# Patient Record
Sex: MALE | NOT HISPANIC OR LATINO | Employment: FULL TIME | URBAN - METROPOLITAN AREA
[De-identification: names, ages, dates, MRNs, and addresses within clinical notes are randomized per-mention and may not be internally consistent; named-entity substitution may affect disease eponyms.]

---

## 2023-09-07 ENCOUNTER — OFFICE VISIT (OUTPATIENT)
Dept: PRIMARY CARE | Facility: CLINIC | Age: 58
End: 2023-09-07
Payer: COMMERCIAL

## 2023-09-07 VITALS
HEART RATE: 62 BPM | DIASTOLIC BLOOD PRESSURE: 68 MMHG | TEMPERATURE: 97 F | WEIGHT: 139 LBS | OXYGEN SATURATION: 97 % | HEIGHT: 65 IN | SYSTOLIC BLOOD PRESSURE: 118 MMHG | BODY MASS INDEX: 23.16 KG/M2

## 2023-09-07 DIAGNOSIS — Z00.00 VISIT FOR PREVENTIVE HEALTH EXAMINATION: Primary | ICD-10-CM

## 2023-09-07 LAB
NON-UH HIE A/G RATIO: 1.2
NON-UH HIE ALB: 3.8 G/DL (ref 3.4–5)
NON-UH HIE ALK PHOS: 75 UNIT/L (ref 46–116)
NON-UH HIE BILIRUBIN, TOTAL: 0.7 MG/DL (ref 0.2–1)
NON-UH HIE BUN/CREAT RATIO: 22.2
NON-UH HIE BUN: 20 MG/DL (ref 9–23)
NON-UH HIE CALCIUM: 9.4 MG/DL (ref 8.7–10.4)
NON-UH HIE CALCULATED LDL CHOLESTEROL: 125 MG/DL (ref 60–130)
NON-UH HIE CALCULATED OSMOLALITY: 282 MOSM/KG (ref 275–295)
NON-UH HIE CHLORIDE: 109 MMOL/L (ref 98–107)
NON-UH HIE CHOLESTEROL: 215 MG/DL (ref 100–200)
NON-UH HIE CO2, VENOUS: 25 MMOL/L (ref 20–31)
NON-UH HIE CREATININE: 0.9 MG/DL (ref 0.6–1.1)
NON-UH HIE FREE T4: 1.06 NG/DL (ref 0.89–1.76)
NON-UH HIE GFR AA: >60
NON-UH HIE GLOBULIN: 3.3 G/DL
NON-UH HIE GLOMERULAR FILTRATION RATE: >60 ML/MIN/1.73M?
NON-UH HIE GLUCOSE: 90 MG/DL (ref 74–106)
NON-UH HIE GOT: 21 UNIT/L (ref 15–37)
NON-UH HIE GPT: 25 UNIT/L (ref 10–49)
NON-UH HIE HCT: 42.9 % (ref 41–52)
NON-UH HIE HDL CHOLESTEROL: 57 MG/DL (ref 40–60)
NON-UH HIE HGB: 14.4 G/DL (ref 13.5–17.5)
NON-UH HIE INSTR WBC ND: 5.8
NON-UH HIE K: 4.4 MMOL/L (ref 3.5–5.1)
NON-UH HIE MCH: 28.5 PG (ref 27–34)
NON-UH HIE MCHC: 33.4 G/DL (ref 32–37)
NON-UH HIE MCV: 85.1 FL (ref 80–100)
NON-UH HIE MPV: 8.5 FL (ref 7.4–10.4)
NON-UH HIE NA: 140 MMOL/L (ref 135–145)
NON-UH HIE PLATELET: 255 X10 (ref 150–450)
NON-UH HIE PROSTATIC SPECIFIC AG SCREEN: 0.4 NG/ML (ref 0–4)
NON-UH HIE RBC: 5.04 X10 (ref 4.7–6.1)
NON-UH HIE RDW: 12.9 % (ref 11.5–14.5)
NON-UH HIE TOTAL CHOL/HDL CHOL RATIO: 3.8
NON-UH HIE TOTAL PROTEIN: 7.1 G/DL (ref 5.7–8.2)
NON-UH HIE TRIGLYCERIDES: 167 MG/DL (ref 30–150)
NON-UH HIE TSH: 0.51 UIU/ML (ref 0.55–4.78)
NON-UH HIE WBC: 5.8 X10 (ref 4.5–11)

## 2023-09-07 PROCEDURE — 99396 PREV VISIT EST AGE 40-64: CPT | Performed by: FAMILY MEDICINE

## 2023-09-07 NOTE — PROGRESS NOTES
"Subjective   Patient ID: Kennedi Perez is a 58 y.o. male who presents for Annual Exam.    Assessment/Plan     Problem List Items Addressed This Visit    None  Visit Diagnoses       Visit for preventive health examination    -  Primary        Cologuard July 2021 within normal limits  Fasting lab work today  PSA check today  CT chest for lung cancer screening 12/22 wnl- consider  12/23  Prevnar 20 received  Discussed about shingles vaccine  Staying physically active  Follow-up every year for physical  Symptomatic treatment for cough congestion  Patient understands and agrees with plan.    CT cardiac scoring  HPI    58-year-old male here for physical     History of smoking more than 20 years 1 pack/day  Chewing tobacco    Discussed in detail about quitting tobacco use using nicotine gums  Occasional alcohol use  History of appendectomy        Otherwise no new signs and symptoms    No Known Allergies    No current outpatient medications on file.     No current facility-administered medications for this visit.       Objective   Visit Vitals  /68 (BP Location: Left arm, Patient Position: Sitting)   Pulse 62   Temp 36.1 °C (97 °F)   Ht 1.651 m (5' 5\")   Wt 63 kg (139 lb)   SpO2 97%   BMI 23.13 kg/m²   Smoking Status Never   BSA 1.7 m²     Physical Exam    Constitutional   General appearance: Alert and in no acute distress.   Head and Face   Head and face: Normal.     Palpation of the face and sinuses: Normal.    Eyes   Inspection of eyes: Sclera and conjunctiva were normal.    Pupil exam: Pupils were equal in size. Extraocular movements were intact.   Ears, Nose, Mouth, and Throat   Ears: Auricles: Normal.    Otoscopic examination: Tympanic membranes: Normal with no congestion and no discharge. Otic Canals: Normal without tenderness, congestion or discharge.    Hearing: Normal.     Nasal mucosa, septum, and turbinates: Normal without edema or erythema.    Lips, teeth, and gums: Normal.    Oropharynx: Normal with " moist mucus membranes, no congestion. Tonsils: Normal no follicles.   Neck   Neck Exam: Appearance of the neck was normal. No neck masses observed.    Thyroid exam: Not enlarged and no palpable thyroid nodules.   Pulmonary   Respiratory assessment: No respiratory distress, normal respiratory rhythm and effort.    Auscultation of Lungs: Clear bilateral breath sounds.   Cardiovascular   Auscultation of heart: Apical pulse normal, heart rate and rhythm normal, normal S1 and S2, no murmurs and no pericardial rub.    Carotid arteries: Pulses normal with no bruits.    Exam for edema: No peripheral edema.   Chest   Chest: Normal A_P diameter, no pulsation, no intercostal withdrawing. Trachea central.   Abdomen   Abdominal Exam: No bruits, normal bowel sounds, soft, non-tender, no abdominal mass palpated.    Liver and Spleen exam: No hepato-splenomegaly.    Examination for hernias: Normal.    Lymphatic   Palpation of lymph nodes in neck: No cervical lymphadenopathy.   Musculoskeletal   Examination of gait: Normal.    Inspection of digits and nails: No clubbing or cyanosis of the fingernails.    Inspection/palpation of joints, bones and muscles: No joint swelling. Normal movement of all extremities.    Range of Motion: Normal movement of all extremities.   Skin   Skin inspection: Normal skin color and pigmentation, normal skin turgor and no visible rash.   Neurologic   Cranial nerves: Nerves 2-12 were intact, no focal neuro defects.    Reflexes: Normal.     Sensation: Normal.     Coordination: Normal.    Psychiatric   Judgment and insight: Intact.    Orientation: Oriented to person, place, and time.    Recent and remote memory: Normal.     Mood and affect: Normal.     Genitourinary Declined.    Immunization History   Administered Date(s) Administered    Influenza, seasonal, injectable 10/19/2022    Moderna SARS-CoV-2 Vaccination 04/10/2021, 05/13/2021, 12/17/2021    Pneumococcal conjugate vaccine, 20-valent, adult (PREVNAR  20) 11/04/2022       Review of Systems    No visits with results within 4 Month(s) from this visit.   Latest known visit with results is:   Legacy Encounter on 11/04/2022   Component Date Value Ref Range Status    Color, Urine 11/04/2022 YELLOW  STRAW,YELLOW Final    Appearance, Urine 11/04/2022 CLEAR  CLEAR Final    Specific Gravity, Urine 11/04/2022 1.015  1.005 - 1.035 Final    pH, Urine 11/04/2022 5.0  5.0 - 8.0 Final    Protein, Urine 11/04/2022 NEGATIVE  NEGATIVE mg/dL Final    Glucose, Urine 11/04/2022 NEGATIVE  NEGATIVE mg/dL Final    Blood, Urine 11/04/2022 NEGATIVE  NEGATIVE Final    Ketones, Urine 11/04/2022 NEGATIVE  NEGATIVE mg/dL Final    Bilirubin, Urine 11/04/2022 NEGATIVE  NEGATIVE Final    Urobilinogen, Urine 11/04/2022 <2.0  0.0 - 1.9 mg/dL Final    Nitrite, Urine 11/04/2022 NEGATIVE  NEGATIVE Final    Leukocyte Esterase, Urine 11/04/2022 NEGATIVE  NEGATIVE Final    Cholesterol 11/04/2022 212 (H)  0 - 199 mg/dL Final    HDL 11/04/2022 59.4  mg/dL Final    Cholesterol/HDL Ratio 11/04/2022 3.6   Final    LDL 11/04/2022 141 (H)  0 - 99 mg/dL Final    VLDL 11/04/2022 11  0 - 40 mg/dL Final    Triglycerides 11/04/2022 56  0 - 149 mg/dL Final    Glucose 11/04/2022 86  74 - 99 mg/dL Final    Sodium 11/04/2022 140  136 - 145 mmol/L Final    Potassium 11/04/2022 4.2  3.5 - 5.3 mmol/L Final    Chloride 11/04/2022 107  98 - 107 mmol/L Final    Bicarbonate 11/04/2022 24  21 - 32 mmol/L Final    Anion Gap 11/04/2022 13  10 - 20 mmol/L Final    Urea Nitrogen 11/04/2022 17  6 - 23 mg/dL Final    Creatinine 11/04/2022 0.88  0.50 - 1.30 mg/dL Final    GFR MALE 11/04/2022 >90  >90 mL/min/1.73m2 Final    Calcium 11/04/2022 9.4  8.6 - 10.6 mg/dL Final    Albumin 11/04/2022 4.7  3.4 - 5.0 g/dL Final    Alkaline Phosphatase 11/04/2022 75  33 - 120 U/L Final    Total Protein 11/04/2022 7.5  6.4 - 8.2 g/dL Final    AST 11/04/2022 19  9 - 39 U/L Final    Total Bilirubin 11/04/2022 0.6  0.0 - 1.2 mg/dL Final    ALT (SGPT)  11/04/2022 22  10 - 52 U/L Final    WBC 11/04/2022 8.5  4.4 - 11.3 x10E9/L Final    nRBC 11/04/2022 0.0  0.0 - 0.0 /100 WBC Final    RBC 11/04/2022 5.27  4.50 - 5.90 x10E12/L Final    Hemoglobin 11/04/2022 14.9  13.5 - 17.5 g/dL Final    Hematocrit 11/04/2022 47.4  41.0 - 52.0 % Final    MCV 11/04/2022 90  80 - 100 fL Final    MCHC 11/04/2022 31.4 (L)  32.0 - 36.0 g/dL Final    Platelets 11/04/2022 360  150 - 450 x10E9/L Final    RDW 11/04/2022 12.5  11.5 - 14.5 % Final    Prostate Specific Antigen,Screen 11/04/2022 0.46  0.00 - 4.00 ng/mL Final    TSH 11/04/2022 0.45  0.44 - 3.98 mIU/L Final       Radiology: Reviewed imaging in powerchart.  No results found.    No family history on file.  Social History     Socioeconomic History    Marital status:      Spouse name: None    Number of children: None    Years of education: None    Highest education level: None   Occupational History    None   Tobacco Use    Smoking status: Never    Smokeless tobacco: Current   Substance and Sexual Activity    Alcohol use: Never    Drug use: Never    Sexual activity: None   Other Topics Concern    None   Social History Narrative    None     Social Determinants of Health     Financial Resource Strain: Not on file   Food Insecurity: Not on file   Transportation Needs: Not on file   Physical Activity: Not on file   Stress: Not on file   Social Connections: Not on file   Intimate Partner Violence: Not on file   Housing Stability: Not on file     History reviewed. No pertinent past medical history.  History reviewed. No pertinent surgical history.    Charting was completed using voice recognition technology and may include unintended errors.

## 2023-10-30 ENCOUNTER — TELEMEDICINE (OUTPATIENT)
Dept: PRIMARY CARE | Facility: CLINIC | Age: 58
End: 2023-10-30
Payer: COMMERCIAL

## 2023-10-30 VITALS — HEIGHT: 65 IN | BODY MASS INDEX: 21.66 KG/M2 | WEIGHT: 130 LBS

## 2023-10-30 DIAGNOSIS — R05.1 ACUTE COUGH: Primary | ICD-10-CM

## 2023-10-30 DIAGNOSIS — R50.9 FEVER, UNSPECIFIED FEVER CAUSE: ICD-10-CM

## 2023-10-30 DIAGNOSIS — U07.1 COVID-19 VIRUS DETECTED: ICD-10-CM

## 2023-10-30 PROCEDURE — 99213 OFFICE O/P EST LOW 20 MIN: CPT | Performed by: FAMILY MEDICINE

## 2023-10-30 NOTE — PROGRESS NOTES
"Subjective   Patient ID: Kamleshkumar MESSI Perez is a 58 y.o. male who presents for Covid-19 Home Monitoring Video Visit and Cough (Headache, fever, ).    Assessment/Plan     Problem List Items Addressed This Visit    None  Visit Diagnoses       Acute cough    -  Primary    Relevant Medications    nirmatrelvir-ritonavir (PAXLOVID) 300 mg (150 mg x 2)-100 mg tablet therapy pack    COVID-19 virus detected        Relevant Medications    nirmatrelvir-ritonavir (PAXLOVID) 300 mg (150 mg x 2)-100 mg tablet therapy pack    Fever, unspecified fever cause        Relevant Medications    nirmatrelvir-ritonavir (PAXLOVID) 300 mg (150 mg x 2)-100 mg tablet therapy pack        This visit was completed via VIRTUAL VIDEO/Audio due to the restrictions of the COVID-19 pandemic. All issues as below were discussed and addressed but no physical exam was performed. If it was felt that the patient should be evaluated in clinic then they were directed there. The patient verbally consented to visit.     Phone call was made per pt preference    HPI    58 y old m c/o fever headache since last 1 day started last night    Taking tylenol    No n. V. Cp, sob    Tested positive for covid today morning    Consider above rx - call us back if sx are worsening, pt agrees.    No Known Allergies    Current Outpatient Medications   Medication Sig Dispense Refill    nirmatrelvir-ritonavir (PAXLOVID) 300 mg (150 mg x 2)-100 mg tablet therapy pack Take 3 tablets by mouth 2 times a day for 5 days. Follow the instructions on the package 30 tablet 0     No current facility-administered medications for this visit.       Objective   Visit Vitals  Ht 1.651 m (5' 5\")   Wt 59 kg (130 lb)   BMI 21.63 kg/m²   Smoking Status Never   BSA 1.64 m²     Physical Exam    Immunization History   Administered Date(s) Administered    Influenza, seasonal, injectable 10/19/2022    Moderna SARS-CoV-2 Vaccination 04/10/2021, 05/13/2021, 12/17/2021    Pneumococcal conjugate vaccine, " 20-valent (PREVNAR 20) 11/04/2022       Review of Systems    Orders Only on 09/07/2023   Component Date Value Ref Range Status    [facility] RBC 09/07/2023 5.04  4.70 - 6.10 x10 Final    [facility] RDW 09/07/2023 12.9  11.5 - 14.5 % Final    [facility] MCH 09/07/2023 28.5  27.0 - 34.0 pg Final    [facility] WBC 09/07/2023 5.8  4.5 - 11.0 x10 Final    [facility] HCT 09/07/2023 42.9  41.0 - 52.0 % Final    [facility] Platelet 09/07/2023 255  150 - 450 x10 Final    [facility] MCHC 09/07/2023 33.4  32.0 - 37.0 g/dL Final    [facility] MCV 09/07/2023 85.1  80.0 - 100.0 fL Final    [facility] Instr WBC ND 09/07/2023 5.8   Final    [facility] MPV 09/07/2023 8.5  7.4 - 10.4 fL Final    [facility] HGB 09/07/2023 14.4  13.5 - 17.5 g/dL Final    [facility] Prostatic Specific Ag S* 09/07/2023 0.4  0.0 - 4.0 ng/mL Final    [facility] TSH 09/07/2023 0.51 (L)  0.55 - 4.78 uIU/ml Final    [facility] CO2, venous 09/07/2023 25.0  20.0 - 31.0 mmol/L Final    [facility] Glomerular Filtration R* 09/07/2023 >60  mL/min/1.73m? Final    [facility] Calculated Osmolality 09/07/2023 282  275 - 295 mOsm/kg Final    [facility] K 09/07/2023 4.4  3.5 - 5.1 mmol/L Final    [facility] Globulin 09/07/2023 3.3  g/dL Final    [facility] Calcium 09/07/2023 9.4  8.7 - 10.4 mg/dL Final    [facility] BUN 09/07/2023 20  9 - 23 mg/dL Final    [facility] GOT 09/07/2023 21  15 - 37 unit/L Final    [facility] ALB 09/07/2023 3.8  3.4 - 5.0 g/dL Final    [facility] Glucose 09/07/2023 90  74 - 106 mg/dL Final    [facility] GFR AA 09/07/2023 >60   Final    [facility] Bilirubin, Total 09/07/2023 0.70  0.20 - 1.00 mg/dL Final    [facility] Chloride 09/07/2023 109 (H)  98 - 107 mmol/L Final    [facility] A/G Ratio 09/07/2023 1.2   Final    [facility] Na 09/07/2023 140  135 - 145 mmol/L Final    [facility] BUN/Creat Ratio 09/07/2023 22.2   Final    [facility] GPT 09/07/2023 25  10 - 49 unit/L Final    [facility] Creatinine 09/07/2023 0.9  0.6 - 1.1 mg/dL Final     [facility] Alk Phos 09/07/2023 75  46 - 116 unit/L Final    [facility] Total Protein 09/07/2023 7.1  5.7 - 8.2 g/dL Final    [facility] Total Chol/HDL Chol Rat* 09/07/2023 3.8   Final    [facility] Triglycerides 09/07/2023 167 (H)  30 - 150 mg/dL Final    [facility] Cholesterol 09/07/2023 215 (H)  100 - 200 mg/dL Final    [facility] Calculated LDL Choleste* 09/07/2023 125  60 - 130 mg/dL Final    [facility] HDL Cholesterol 09/07/2023 57  40 - 60 mg/dL Final    [facility] Free T4 09/07/2023 1.06  0.89 - 1.76 ng/dL Final       Radiology: Reviewed imaging in powerchart.  No results found.    No family history on file.  Social History     Socioeconomic History    Marital status:      Spouse name: None    Number of children: None    Years of education: None    Highest education level: None   Occupational History    None   Tobacco Use    Smoking status: Never    Smokeless tobacco: Current   Substance and Sexual Activity    Alcohol use: Never    Drug use: Never    Sexual activity: None   Other Topics Concern    None   Social History Narrative    None     Social Determinants of Health     Financial Resource Strain: Not on file   Food Insecurity: Not on file   Transportation Needs: Not on file   Physical Activity: Not on file   Stress: Not on file   Social Connections: Not on file   Intimate Partner Violence: Not on file   Housing Stability: Not on file     History reviewed. No pertinent past medical history.  History reviewed. No pertinent surgical history.    Charting was completed using voice recognition technology and may include unintended errors.

## 2024-11-17 ENCOUNTER — OFFICE VISIT (OUTPATIENT)
Dept: URGENT CARE | Age: 59
End: 2024-11-17
Payer: COMMERCIAL

## 2024-11-17 VITALS
RESPIRATION RATE: 20 BRPM | TEMPERATURE: 98 F | SYSTOLIC BLOOD PRESSURE: 137 MMHG | HEART RATE: 65 BPM | DIASTOLIC BLOOD PRESSURE: 89 MMHG | OXYGEN SATURATION: 95 %

## 2024-11-17 DIAGNOSIS — J02.9 SORE THROAT: Primary | ICD-10-CM

## 2024-11-17 DIAGNOSIS — J02.9 PHARYNGITIS, UNSPECIFIED ETIOLOGY: ICD-10-CM

## 2024-11-17 LAB — POC RAPID STREP: NEGATIVE

## 2024-11-17 PROCEDURE — 87880 STREP A ASSAY W/OPTIC: CPT | Performed by: NURSE PRACTITIONER

## 2024-11-17 PROCEDURE — 99204 OFFICE O/P NEW MOD 45 MIN: CPT | Performed by: NURSE PRACTITIONER

## 2024-11-17 RX ORDER — AMOXICILLIN 500 MG/1
500 CAPSULE ORAL EVERY 12 HOURS SCHEDULED
Qty: 20 CAPSULE | Refills: 0 | Status: SHIPPED | OUTPATIENT
Start: 2024-11-17 | End: 2024-11-27

## 2024-11-17 ASSESSMENT — ENCOUNTER SYMPTOMS
FEVER: 0
SORE THROAT: 1
CHILLS: 0
COUGH: 1

## 2024-11-17 NOTE — LETTER
November 17, 2024     Patient: Kennedi Perez   YOB: 1965   Date of Visit: 11/17/2024       To Whom It May Concern:    It is my medical opinion that Kennedi Perez may return to work on 11/19/24 .    If you have any questions or concerns, please don't hesitate to call.         Sincerely,        Marti Saavedra, MARQUIS-CNP    CC: No Recipients

## 2024-11-17 NOTE — PROGRESS NOTES
"Subjective   Patient ID: Kamleshkumar MESSI Perez is a 59 y.o. male. They present today with a chief complaint of Sore Throat (\"Itching\" ) and Cough (X 5 days - using otc).    History of Present Illness    Sore Throat   Associated symptoms include coughing.   Cough  Associated symptoms include a sore throat. Pertinent negatives include no chills or fever.       Patient presents to urgent care for complaints of sore throat and cough for 5 days.  Reports that the throat pain is worsening and is having pain with swallowing.  Denies trying any over-the-counter medications.  Past Medical History  Allergies as of 11/17/2024    (No Known Allergies)       (Not in a hospital admission)       No past medical history on file.    No past surgical history on file.     reports that he has never smoked. He uses smokeless tobacco. He reports that he does not drink alcohol and does not use drugs.    Review of Systems  Review of Systems   Constitutional:  Negative for chills and fever.   HENT:  Positive for sore throat.    Respiratory:  Positive for cough.                                   Objective    Vitals:    11/17/24 1305   BP: 137/89   Pulse: 65   Resp: 20   Temp: 36.7 °C (98 °F)   TempSrc: Temporal   SpO2: 95%     No LMP for male patient.    Physical Exam  Vitals reviewed.   Constitutional:       Appearance: Normal appearance.   HENT:      Head: Normocephalic.      Right Ear: Tympanic membrane, ear canal and external ear normal.      Left Ear: Tympanic membrane, ear canal and external ear normal.      Nose: Nose normal.      Mouth/Throat:      Mouth: Mucous membranes are moist.      Pharynx: Posterior oropharyngeal erythema present.   Eyes:      Conjunctiva/sclera: Conjunctivae normal.   Cardiovascular:      Rate and Rhythm: Normal rate and regular rhythm.      Heart sounds: Normal heart sounds.   Pulmonary:      Effort: Pulmonary effort is normal.      Breath sounds: Normal breath sounds.   Lymphadenopathy:      Cervical: " Cervical adenopathy present.   Skin:     General: Skin is warm and dry.   Neurological:      Mental Status: He is alert.         Procedures    Point of Care Test & Imaging Results from this visit  Results for orders placed or performed in visit on 11/17/24   POCT rapid strep A manually resulted   Result Value Ref Range    POC Rapid Strep Negative Negative      No results found.    Diagnostic study results (if any) were reviewed by ERIKA Tolbert.    Assessment/Plan   Allergies, medications, history, and pertinent labs/EKGs/Imaging reviewed by ERIKA Tolbert.     Medical Decision Making  Rapid strep was negative. Tonsils were erythematous and patient did have cervical adenopathy. Will try amoxicillin. Patient was told to use tylenol or ibuprofen as needed for pain. You may use throat lozenges as needed. Follow up with PCP if needed.     Orders and Diagnoses  Diagnoses and all orders for this visit:  Sore throat  -     POCT rapid strep A manually resulted  Pharyngitis, unspecified etiology      Medical Admin Record      Patient disposition: Home    Electronically signed by ERIKA Tolbert  1:30 PM